# Patient Record
Sex: FEMALE | Race: WHITE | Employment: UNEMPLOYED | URBAN - METROPOLITAN AREA
[De-identification: names, ages, dates, MRNs, and addresses within clinical notes are randomized per-mention and may not be internally consistent; named-entity substitution may affect disease eponyms.]

---

## 2023-09-28 ENCOUNTER — OFFICE VISIT (OUTPATIENT)
Age: 33
End: 2023-09-28
Payer: OTHER GOVERNMENT

## 2023-09-28 VITALS
BODY MASS INDEX: 22.49 KG/M2 | HEIGHT: 65 IN | OXYGEN SATURATION: 100 % | SYSTOLIC BLOOD PRESSURE: 123 MMHG | DIASTOLIC BLOOD PRESSURE: 76 MMHG | RESPIRATION RATE: 18 BRPM | WEIGHT: 135 LBS | HEART RATE: 68 BPM

## 2023-09-28 DIAGNOSIS — Z12.4 ENCOUNTER FOR SCREENING FOR MALIGNANT NEOPLASM OF CERVIX: ICD-10-CM

## 2023-09-28 DIAGNOSIS — Z01.419 ENCOUNTER FOR ANNUAL ROUTINE GYNECOLOGICAL EXAMINATION: Primary | ICD-10-CM

## 2023-09-28 DIAGNOSIS — Z80.3 FAMILY HISTORY OF BREAST CANCER: ICD-10-CM

## 2023-09-28 DIAGNOSIS — N89.8 VAGINAL DISCHARGE: ICD-10-CM

## 2023-09-28 DIAGNOSIS — Z11.3 ROUTINE SCREENING FOR STI (SEXUALLY TRANSMITTED INFECTION): ICD-10-CM

## 2023-09-28 PROCEDURE — 99385 PREV VISIT NEW AGE 18-39: CPT | Performed by: OBSTETRICS & GYNECOLOGY

## 2023-09-28 RX ORDER — COPPER 313.4 MG/1
1 INTRAUTERINE DEVICE INTRAUTERINE ONCE
COMMUNITY

## 2023-10-02 LAB
., LABCORP: NORMAL
C TRACH RRNA CVX QL NAA+PROBE: NEGATIVE
CYTOLOGIST CVX/VAG CYTO: NORMAL
CYTOLOGY CVX/VAG DOC CYTO: NORMAL
CYTOLOGY CVX/VAG DOC THIN PREP: NORMAL
DX ICD CODE: NORMAL
Lab: NORMAL
N GONORRHOEA RRNA CVX QL NAA+PROBE: NEGATIVE
OTHER STN SPEC: NORMAL
STAT OF ADQ CVX/VAG CYTO-IMP: NORMAL

## 2024-04-19 ENCOUNTER — APPOINTMENT (OUTPATIENT)
Facility: HOSPITAL | Age: 34
End: 2024-04-19
Payer: OTHER GOVERNMENT

## 2024-04-19 ENCOUNTER — HOSPITAL ENCOUNTER (EMERGENCY)
Facility: HOSPITAL | Age: 34
Discharge: HOME | End: 2024-04-19
Attending: EMERGENCY MEDICINE
Payer: OTHER GOVERNMENT

## 2024-04-19 VITALS
RESPIRATION RATE: 18 BRPM | BODY MASS INDEX: 21.63 KG/M2 | OXYGEN SATURATION: 100 % | TEMPERATURE: 98 F | HEART RATE: 68 BPM | WEIGHT: 130 LBS | DIASTOLIC BLOOD PRESSURE: 72 MMHG | SYSTOLIC BLOOD PRESSURE: 125 MMHG

## 2024-04-19 DIAGNOSIS — S61.112A LACERATION OF LEFT THUMB WITHOUT FOREIGN BODY WITH DAMAGE TO NAIL, INITIAL ENCOUNTER: Primary | ICD-10-CM

## 2024-04-19 PROCEDURE — 6360000002 HC RX W HCPCS

## 2024-04-19 PROCEDURE — 90714 TD VACC NO PRESV 7 YRS+ IM: CPT

## 2024-04-19 PROCEDURE — 12001 RPR S/N/AX/GEN/TRNK 2.5CM/<: CPT

## 2024-04-19 PROCEDURE — 90471 IMMUNIZATION ADMIN: CPT

## 2024-04-19 PROCEDURE — 6370000000 HC RX 637 (ALT 250 FOR IP)

## 2024-04-19 PROCEDURE — 99284 EMERGENCY DEPT VISIT MOD MDM: CPT

## 2024-04-19 PROCEDURE — 73140 X-RAY EXAM OF FINGER(S): CPT

## 2024-04-19 RX ORDER — CEPHALEXIN 500 MG/1
500 CAPSULE ORAL ONCE
Status: COMPLETED | OUTPATIENT
Start: 2024-04-19 | End: 2024-04-19

## 2024-04-19 RX ORDER — TETANUS AND DIPHTHERIA TOXOIDS ADSORBED 2; 2 [LF]/.5ML; [LF]/.5ML
0.5 INJECTION INTRAMUSCULAR ONCE
Status: COMPLETED | OUTPATIENT
Start: 2024-04-19 | End: 2024-04-19

## 2024-04-19 RX ORDER — LIDOCAINE HYDROCHLORIDE 10 MG/ML
10 INJECTION, SOLUTION INFILTRATION; PERINEURAL
Status: DISCONTINUED | OUTPATIENT
Start: 2024-04-19 | End: 2024-04-19 | Stop reason: HOSPADM

## 2024-04-19 RX ORDER — ONDANSETRON 4 MG/1
4 TABLET, ORALLY DISINTEGRATING ORAL
Status: COMPLETED | OUTPATIENT
Start: 2024-04-19 | End: 2024-04-19

## 2024-04-19 RX ORDER — IBUPROFEN 800 MG/1
800 TABLET ORAL
Status: COMPLETED | OUTPATIENT
Start: 2024-04-19 | End: 2024-04-19

## 2024-04-19 RX ORDER — IBUPROFEN 600 MG/1
600 TABLET ORAL 3 TIMES DAILY PRN
Qty: 30 TABLET | Refills: 0 | Status: SHIPPED | OUTPATIENT
Start: 2024-04-19

## 2024-04-19 RX ORDER — CEPHALEXIN 500 MG/1
500 CAPSULE ORAL 4 TIMES DAILY
Qty: 28 CAPSULE | Refills: 0 | Status: SHIPPED | OUTPATIENT
Start: 2024-04-19 | End: 2024-04-26

## 2024-04-19 RX ADMIN — ONDANSETRON 4 MG: 4 TABLET, ORALLY DISINTEGRATING ORAL at 15:51

## 2024-04-19 RX ADMIN — CEPHALEXIN 500 MG: 500 CAPSULE ORAL at 15:51

## 2024-04-19 RX ADMIN — IBUPROFEN 800 MG: 800 TABLET, FILM COATED ORAL at 15:51

## 2024-04-19 RX ADMIN — TETANUS AND DIPHTHERIA TOXOIDS ADSORBED 0.5 ML: 2; 2 INJECTION INTRAMUSCULAR at 15:50

## 2024-04-19 ASSESSMENT — PAIN SCALES - GENERAL
PAINLEVEL_OUTOF10: 8
PAINLEVEL_OUTOF10: 5
PAINLEVEL_OUTOF10: 4

## 2024-04-19 ASSESSMENT — PAIN - FUNCTIONAL ASSESSMENT
PAIN_FUNCTIONAL_ASSESSMENT: 0-10

## 2024-04-19 NOTE — DISCHARGE INSTRUCTIONS
Thank you!  Thank you for allowing me to care for you in the emergency department. It is my goal to provide you with excellent care.  Please fill out the survey that will come to you by mail or email since we listen to your feedback!     Below you will find a list of your tests from today's visit.  Should you have any questions, please do not hesitate to call the emergency department.    Labs  No results found for this or any previous visit (from the past 12 hour(s)).    Radiologic Studies  XR FINGER LEFT (MIN 2 VIEWS)   Final Result   Soft tissue injury. No fracture or radiodense foreign body.           ------------------------------------------------------------------------------------------------------------  The exam and treatment you received in the Emergency Department were for an urgent problem and are not intended as complete care. It is important that you follow-up with a doctor, nurse practitioner, or physician assistant to:  (1) confirm your diagnosis,  (2) re-evaluation of changes in your illness and treatment, and (3) for ongoing care. Please take your discharge instructions with you when you go to your follow-up appointment.     If you have any problem arranging a follow-up appointment, contact the Emergency Department.  If your symptoms become worse or you do not improve as expected and you are unable to reach your health care provider, please return to the Emergency Department. We are available 24 hours a day.     If a prescription has been provided, please have it filled as soon as possible to prevent a delay in treatment. If you have any questions or reservations about taking the medication due to side effects or interactions with other medications, please call your primary care provider or contact the ER.

## 2024-04-19 NOTE — ED TRIAGE NOTES
Pt states she was using a  and cut her left thumb. There is a laceration to the tip of the thumb and through the nail. Unknown last tetanus.

## 2024-04-19 NOTE — ED PROVIDER NOTES
[facility] EMERGENCY DEPT  EMERGENCY DEPARTMENT HISTORY AND PHYSICAL EXAM      Date: 4/19/2024  Patient Name: Hilda Andrew  MRN: [id number]  YOB: 1990  Date of evaluation: 4/19/2024  Provider: Edin Oneill PA-C   Note Started: 2:31 PM EDT 4/19/24    HISTORY OF PRESENT ILLNESS     Chief Complaint   Patient presents with    Laceration       History Provided By: Patient    HPI: Hilda Andrew is a 33 y.o. female no reported past medical history presents emerged from today with complaint of laceration starting prior to arrival.  Was using  dog jumped up.  Patient is used and causing her to cut her left thumb.  Nails involved.  Rates pain 4 out of 10 in severity.  Comes in today for further evaluation.  Tetanus status unknown.  Denies any other injury.  No numbness tingling.  Able to move it well.  With some pain    PAST MEDICAL HISTORY   Past Medical History:  History reviewed. No pertinent past medical history.    Past Surgical History:  History reviewed. No pertinent surgical history.    Family History:  Family History   Problem Relation Age of Onset    Breast Cancer Maternal Grandmother     Cancer Maternal Aunt        Social History:  Social History     Tobacco Use    Smoking status: Never    Smokeless tobacco: Never   Substance Use Topics    Alcohol use: Yes    Drug use: Never       Allergies:  No Known Allergies    PCP: None, None    Current Meds:   Current Facility-Administered Medications   Medication Dose Route Frequency Provider Last Rate Last Admin    lidocaine 1 % injection 10 mL  10 mL IntraDERmal NOW Edin Oneill PA-C         Current Outpatient Medications   Medication Sig Dispense Refill    ibuprofen (ADVIL;MOTRIN) 600 MG tablet Take 1 tablet by mouth 3 times daily as needed for Pain 30 tablet 0    cephALEXin (KEFLEX) 500 MG capsule Take 1 capsule by mouth 4 times daily for 7 days 28 capsule 0    Paragard Intrauterine Copper IUD 1 each by IntraUTERine route once      terconazole  Repair    Date/Time: 4/19/2024 4:44 PM    Performed by: Edin Oneill PA-C  Authorized by: Too Prado MD    Consent:     Consent obtained:  Verbal    Consent given by:  Patient    Risks discussed:  Infection, pain, retained foreign body, poor cosmetic result and need for additional repair  Universal protocol:     Patient identity confirmed:  Verbally with patient and arm band  Anesthesia:     Anesthesia method:  Local infiltration    Local anesthetic:  Lidocaine 1% w/o epi  Laceration details:     Location:  Finger    Finger location:  L thumb    Length (cm):  2.4    Depth (mm):  2  Pre-procedure details:     Preparation:  Imaging obtained to evaluate for foreign bodies  Exploration:     Hemostasis achieved with:  Direct pressure    Imaging obtained: x-ray      Imaging outcome: foreign body not noted      Wound exploration: wound explored through full range of motion      Contaminated: no    Treatment:     Area cleansed with:  Povidone-iodine and saline    Amount of cleaning:  Standard    Irrigation method:  Pressure wash and syringe  Skin repair:     Repair method:  Sutures    Suture size:  4-0 and 5-0    Suture material:  Nylon and Prolene    Number of sutures:  4  Approximation:     Approximation:  Close  Repair type:     Repair type:  Simple  Post-procedure details:     Dressing:  Non-adherent dressing    Procedure completion:  Tolerated well, no immediate complications  Comments:      Two 5-0 Prolene two 4 nylon was used        CRITICAL CARE TIME   Patient does not meet Critical Care Time, 0 minutes    ED IMPRESSION     1. Laceration of left thumb without foreign body with damage to nail, initial encounter          DISPOSITION/PLAN   DISPOSITION Decision To Discharge 04/19/2024 04:54:57 PM    Discharge Note: The patient is stable for discharge home. The signs, symptoms, diagnosis, and discharge instructions have been discussed, understanding conveyed, and agreed upon. The patient is to follow up as

## 2024-04-30 ENCOUNTER — HOSPITAL ENCOUNTER (EMERGENCY)
Facility: HOSPITAL | Age: 34
Discharge: HOME | End: 2024-04-30

## 2024-04-30 VITALS
HEART RATE: 82 BPM | RESPIRATION RATE: 16 BRPM | WEIGHT: 130 LBS | DIASTOLIC BLOOD PRESSURE: 75 MMHG | HEIGHT: 65 IN | TEMPERATURE: 98.1 F | OXYGEN SATURATION: 100 % | SYSTOLIC BLOOD PRESSURE: 110 MMHG | BODY MASS INDEX: 21.66 KG/M2

## 2024-04-30 DIAGNOSIS — Z48.02 VISIT FOR SUTURE REMOVAL: Primary | ICD-10-CM

## 2024-04-30 ASSESSMENT — LIFESTYLE VARIABLES
HOW OFTEN DO YOU HAVE A DRINK CONTAINING ALCOHOL: NEVER
HOW MANY STANDARD DRINKS CONTAINING ALCOHOL DO YOU HAVE ON A TYPICAL DAY: PATIENT DOES NOT DRINK

## 2024-04-30 ASSESSMENT — PAIN - FUNCTIONAL ASSESSMENT: PAIN_FUNCTIONAL_ASSESSMENT: 0-10

## 2024-04-30 ASSESSMENT — PAIN SCALES - GENERAL: PAINLEVEL_OUTOF10: 4

## 2024-04-30 NOTE — ED PROVIDER NOTES
[facility] EMERGENCY DEPT  EMERGENCY DEPARTMENT HISTORY AND PHYSICAL EXAM      Date: 4/30/2024  Patient Name: Hilda Andrew  MRN: [id number]  YOB: 1990  Date of evaluation: 4/30/2024  Provider: Mary Comer PA-C   Note Started: 11:10 AM EDT 4/30/24    HISTORY OF PRESENT ILLNESS     Chief Complaint   Patient presents with    Suture / Staple Removal       History Provided By: Patient    HPI: Hilda Andrew is a 33 y.o. female with no significant past medical history, presents for suture removal.  Patient had sutures placed approximately 10 days ago in her left thumb.  She completely finished course of antibiotics, area is healing well.  She denies any fevers, chills, swelling, drainage.    PAST MEDICAL HISTORY   Past Medical History:  History reviewed. No pertinent past medical history.    Past Surgical History:  History reviewed. No pertinent surgical history.    Family History:  Family History   Problem Relation Age of Onset    Breast Cancer Maternal Grandmother     Cancer Maternal Aunt        Social History:  Social History     Tobacco Use    Smoking status: Never    Smokeless tobacco: Never   Substance Use Topics    Alcohol use: Yes    Drug use: Never       Allergies:  No Known Allergies    PCP: None, None (Inactive)    Current Meds:   No current facility-administered medications for this encounter.     Current Outpatient Medications   Medication Sig Dispense Refill    ibuprofen (ADVIL;MOTRIN) 600 MG tablet Take 1 tablet by mouth 3 times daily as needed for Pain 30 tablet 0    Paragard Intrauterine Copper IUD 1 each by IntraUTERine route once      terconazole (TERAZOL 7) 0.4 % vaginal cream Place vaginally nightly. 45 g 0       Social Determinants of Health:   Social Determinants of Health     Tobacco Use: Low Risk  (4/30/2024)    Patient History     Smoking Tobacco Use: Never     Smokeless Tobacco Use: Never     Passive Exposure: Not on file   Alcohol Use: Not At Risk (4/30/2024)    AUDIT-C